# Patient Record
Sex: FEMALE | Race: BLACK OR AFRICAN AMERICAN
[De-identification: names, ages, dates, MRNs, and addresses within clinical notes are randomized per-mention and may not be internally consistent; named-entity substitution may affect disease eponyms.]

---

## 2020-02-09 NOTE — PDOC
History of Present Illness





- General


Chief Complaint: Pain


Stated Complaint: RIGHT SIDE PAIN


Time Seen by Provider: 20 16:12


History Source: Patient





- History of Present Illness


Timing/Duration: reports: intermittent


Abdominal Pain Onset Location: reports: RLQ


Pain Radiation: reports: no radiation





Past History





- Past Medical History


Allergies/Adverse Reactions: 


 Allergies











Allergy/AdvReac Type Severity Reaction Status Date / Time


 


Fish Containing Products Allergy Severe Hives Verified 20 15:53


 


lactose Allergy Severe Nausea Verified 20 15:53


 


tomato AdvReac Intermediate Nausea Verified 20 15:53











Home Medications: 


Ambulatory Orders





NK [No Known Home Medication]  12/21/15 








Anemia: Yes


Asthma: Yes


Cancer: No


Cardiac Disorders: No


CVA: No


COPD: No


CHF: No


Dementia: No


Diabetes: No


GI Disorders: No


 Disorders: No


HTN: No


Hypercholesterolemia: No


Kidney Stones: No


Liver Disease: No


Seizures: No


Thyroid Disease: No





- Reproductive History


 (#): 3


PID: No





- Immunization History


Immunization Up to Date: No





- Psycho Social/Smoking Cessation Hx


Smoking History: Current every day smoker


Have you smoked in the past 12 months: Yes


Number of Cigarettes Smoked Daily: 5


Information on smoking cessation initiated: No


'Breaking Loose' booklet given: 16


Hx Alcohol Use: Yes (long history of alcohol dependence)


Drug/Substance Use Hx: Yes (PCP)


Substance Use Type: Alcohol


Hx Substance Use Treatment: Yes





Abd/GI Specific PMHX





- Complaint Specific PMHX


Hepatitis: No


Pancreatitis: No





**Review of Systems





- Review of Systems


Constitutional: No: Chills, Fever


ABD/GI: No: Constipated, Diarrhea, Nausea, Vomiting


: No: Burning, Dysuria, Discharge, Hematuria





*Physical Exam





- Vital Signs


 Last Vital Signs











Temp Pulse Resp BP Pulse Ox


 


 98 F   90   18   109/69   100 


 


 20 15:50  20 15:50  20 15:50  20 15:50  20 15:50














- Physical Exam


General Appearance: Yes: Appropriately Dressed.  No: Apparent Distress


HEENT: positive: Normal Voice


Neck: positive: Supple


Respiratory/Chest: negative: Respiratory Distress


Female Pelvic Exam: positive: normal external exam, cervical os closed, other (

Exam limited as pt refused manual exam, states "I don't want to be violated").  

negative: Bartholin mass, vaginal bleeding


Gastrointestinal/Abdominal: positive: Tender (significant ttp to RLQ and mid 

lower abd), Soft


Musculoskeletal: negative: CVA Tenderness


Integumentary: positive: Dry, Warm


Neurologic: positive: Fully Oriented, Alert, Normal Mood/Affect





ED Treatment Course





- LABORATORY


CBC & Chemistry Diagram: 


 20 16:30





 20 16:30





Medical Decision Making





- Medical Decision Making





20 16:20


43 yo F, h/o PTSD, bipolar, polysubstance abuse, asthma, anemia, s/p blood 

transfusion x 2,  (1 ectopic s/p R salpingectomy, 2 elec ABs and 1  spon 

AB), with severe RLQ pain that has been intermittent x1 week.  No change in 

bowel movements, dysuria, vaginal discharge, nausea, vomiting, fever or chills.

  Does report that she missed her period last month.  





see exam





RLQ pain


R/o preg given hx vs appy, less likely uti/pyelo, unable to fully eval for 

possible PID as non-compliant w/ manual exam  


-pain control


-labs/ua


-?US vs CT





20 18:11


Labs only remarkable for + PCP and marijuana.  Patient was escorted to CAT scan 

and while there, refused to have CAT scan done.  When returned to the main ED, 

patient refused further evaluation and was yelling and screaming at staff.  At 

this point security was called to escort patient out of facility











Discharge





- Discharge Information


Problems reviewed: Yes


Clinical Impression/Diagnosis: 


 Patient left before treatment completed





Disposition: LEFT BEFORE MED OSEAS CONTRERAS





- Follow up/Referral





- Patient Discharge Instructions





- Post Discharge Activity

## 2021-06-09 ENCOUNTER — HOSPITAL ENCOUNTER (EMERGENCY)
Dept: HOSPITAL 74 - JER | Age: 46
Discharge: HOME | End: 2021-06-09
Payer: COMMERCIAL

## 2021-06-09 VITALS — SYSTOLIC BLOOD PRESSURE: 106 MMHG | DIASTOLIC BLOOD PRESSURE: 72 MMHG | TEMPERATURE: 97.9 F | HEART RATE: 78 BPM

## 2021-06-09 VITALS — BODY MASS INDEX: 18.8 KG/M2

## 2021-06-09 DIAGNOSIS — D25.9: ICD-10-CM

## 2021-06-09 DIAGNOSIS — N93.8: Primary | ICD-10-CM

## 2021-06-09 DIAGNOSIS — N83.201: ICD-10-CM

## 2021-06-09 LAB
ALBUMIN SERPL-MCNC: 3.9 G/DL (ref 3.4–5)
ALP SERPL-CCNC: 59 U/L (ref 45–117)
ALT SERPL-CCNC: 16 U/L (ref 13–61)
ANION GAP SERPL CALC-SCNC: 7 MMOL/L (ref 8–16)
AST SERPL-CCNC: 15 U/L (ref 15–37)
BASOPHILS # BLD: 1 % (ref 0–2)
BILIRUB SERPL-MCNC: 0.4 MG/DL (ref 0.2–1)
BUN SERPL-MCNC: 6.2 MG/DL (ref 7–18)
CALCIUM SERPL-MCNC: 9.4 MG/DL (ref 8.5–10.1)
CHLORIDE SERPL-SCNC: 108 MMOL/L (ref 98–107)
CO2 SERPL-SCNC: 24 MMOL/L (ref 21–32)
CREAT SERPL-MCNC: 0.7 MG/DL (ref 0.55–1.3)
DEPRECATED RDW RBC AUTO: 20 % (ref 11.6–15.6)
EOSINOPHIL # BLD: 0.9 % (ref 0–4.5)
GLUCOSE SERPL-MCNC: 83 MG/DL (ref 74–106)
HCT VFR BLD CALC: 28.4 % (ref 32.4–45.2)
HGB BLD-MCNC: 9.1 GM/DL (ref 10.7–15.3)
LYMPHOCYTES # BLD: 26.3 % (ref 8–40)
MCH RBC QN AUTO: 23.2 PG (ref 25.7–33.7)
MCHC RBC AUTO-ENTMCNC: 32.2 G/DL (ref 32–36)
MCV RBC: 72 FL (ref 80–96)
MONOCYTES # BLD AUTO: 8.6 % (ref 3.8–10.2)
NEUTROPHILS # BLD: 63.2 % (ref 42.8–82.8)
PLATELET # BLD AUTO: 191 K/MM3 (ref 134–434)
PMV BLD: 9.2 FL (ref 7.5–11.1)
PROT SERPL-MCNC: 7.2 G/DL (ref 6.4–8.2)
RBC # BLD AUTO: 3.94 M/MM3 (ref 3.6–5.2)
SODIUM SERPL-SCNC: 139 MMOL/L (ref 136–145)
WBC # BLD AUTO: 6.2 K/MM3 (ref 4–10)

## 2021-06-09 PROCEDURE — U0005 INFEC AGEN DETEC AMPLI PROBE: HCPCS

## 2021-06-09 PROCEDURE — C9803 HOPD COVID-19 SPEC COLLECT: HCPCS

## 2021-06-09 PROCEDURE — U0003 INFECTIOUS AGENT DETECTION BY NUCLEIC ACID (DNA OR RNA); SEVERE ACUTE RESPIRATORY SYNDROME CORONAVIRUS 2 (SARS-COV-2) (CORONAVIRUS DISEASE [COVID-19]), AMPLIFIED PROBE TECHNIQUE, MAKING USE OF HIGH THROUGHPUT TECHNOLOGIES AS DESCRIBED BY CMS-2020-01-R: HCPCS

## 2021-06-09 PROCEDURE — 3E0337Z INTRODUCTION OF ELECTROLYTIC AND WATER BALANCE SUBSTANCE INTO PERIPHERAL VEIN, PERCUTANEOUS APPROACH: ICD-10-PCS

## 2021-06-09 PROCEDURE — 3E0333Z INTRODUCTION OF ANTI-INFLAMMATORY INTO PERIPHERAL VEIN, PERCUTANEOUS APPROACH: ICD-10-PCS

## 2022-05-05 ENCOUNTER — HOSPITAL ENCOUNTER (INPATIENT)
Dept: HOSPITAL 74 - YASAS | Age: 47
LOS: 14 days | Discharge: HOME | DRG: 772 | End: 2022-05-19
Attending: ALLERGY & IMMUNOLOGY | Admitting: ALLERGY & IMMUNOLOGY
Payer: COMMERCIAL

## 2022-05-05 VITALS — BODY MASS INDEX: 19.7 KG/M2

## 2022-05-05 DIAGNOSIS — F31.60: ICD-10-CM

## 2022-05-05 DIAGNOSIS — B35.3: ICD-10-CM

## 2022-05-05 DIAGNOSIS — Z91.013: ICD-10-CM

## 2022-05-05 DIAGNOSIS — F10.20: Primary | ICD-10-CM

## 2022-05-05 DIAGNOSIS — J45.20: ICD-10-CM

## 2022-05-05 DIAGNOSIS — F16.20: ICD-10-CM

## 2022-05-05 DIAGNOSIS — F17.210: ICD-10-CM

## 2022-05-05 DIAGNOSIS — F29: ICD-10-CM

## 2022-05-05 DIAGNOSIS — F43.10: ICD-10-CM

## 2022-05-05 DIAGNOSIS — R22.9: ICD-10-CM

## 2022-05-05 DIAGNOSIS — Z91.012: ICD-10-CM

## 2022-05-05 DIAGNOSIS — F19.950: ICD-10-CM

## 2022-05-05 DIAGNOSIS — T78.1XXA: ICD-10-CM

## 2022-05-05 DIAGNOSIS — F20.9: ICD-10-CM

## 2022-05-05 DIAGNOSIS — Z56.0: ICD-10-CM

## 2022-05-05 DIAGNOSIS — F19.24: ICD-10-CM

## 2022-05-05 DIAGNOSIS — Z59.00: ICD-10-CM

## 2022-05-05 DIAGNOSIS — Z91.018: ICD-10-CM

## 2022-05-05 PROCEDURE — U0003 INFECTIOUS AGENT DETECTION BY NUCLEIC ACID (DNA OR RNA); SEVERE ACUTE RESPIRATORY SYNDROME CORONAVIRUS 2 (SARS-COV-2) (CORONAVIRUS DISEASE [COVID-19]), AMPLIFIED PROBE TECHNIQUE, MAKING USE OF HIGH THROUGHPUT TECHNOLOGIES AS DESCRIBED BY CMS-2020-01-R: HCPCS

## 2022-05-05 PROCEDURE — U0005 INFEC AGEN DETEC AMPLI PROBE: HCPCS

## 2022-05-05 PROCEDURE — HZ42ZZZ GROUP COUNSELING FOR SUBSTANCE ABUSE TREATMENT, COGNITIVE-BEHAVIORAL: ICD-10-PCS | Performed by: ALLERGY & IMMUNOLOGY

## 2022-05-05 RX ADMIN — Medication SCH MG: at 22:08

## 2022-05-05 RX ADMIN — HYDROXYZINE PAMOATE SCH MG: 25 CAPSULE ORAL at 22:13

## 2022-05-05 RX ADMIN — HYDROXYZINE PAMOATE SCH MG: 25 CAPSULE ORAL at 22:07

## 2022-05-05 RX ADMIN — Medication SCH TAB: at 22:08

## 2022-05-05 RX ADMIN — NICOTINE SCH MG: 7 PATCH TRANSDERMAL at 22:07

## 2022-05-05 SDOH — ECONOMIC STABILITY - INCOME SECURITY: UNEMPLOYMENT, UNSPECIFIED: Z56.0

## 2022-05-05 SDOH — ECONOMIC STABILITY - HOUSING INSECURITY: HOMELESSNESS UNSPECIFIED: Z59.00

## 2022-05-06 RX ADMIN — Medication SCH MG: at 22:48

## 2022-05-06 RX ADMIN — HYDROXYZINE PAMOATE SCH: 25 CAPSULE ORAL at 15:26

## 2022-05-06 RX ADMIN — NICOTINE SCH: 7 PATCH TRANSDERMAL at 11:34

## 2022-05-06 RX ADMIN — QUETIAPINE FUMARATE SCH MG: 100 TABLET ORAL at 22:49

## 2022-05-06 RX ADMIN — HYDROXYZINE PAMOATE SCH: 25 CAPSULE ORAL at 11:35

## 2022-05-06 RX ADMIN — Medication SCH: at 11:35

## 2022-05-06 RX ADMIN — HYDROXYZINE PAMOATE SCH MG: 25 CAPSULE ORAL at 06:48

## 2022-05-07 RX ADMIN — Medication SCH TAB: at 10:51

## 2022-05-07 RX ADMIN — HYDROXYZINE PAMOATE PRN MG: 25 CAPSULE ORAL at 21:15

## 2022-05-07 RX ADMIN — Medication SCH MG: at 21:15

## 2022-05-07 RX ADMIN — NICOTINE SCH MG: 7 PATCH TRANSDERMAL at 10:52

## 2022-05-07 RX ADMIN — Medication SCH MG: at 21:14

## 2022-05-07 RX ADMIN — QUETIAPINE FUMARATE SCH MG: 100 TABLET ORAL at 21:15

## 2022-05-08 RX ADMIN — Medication SCH MG: at 21:24

## 2022-05-08 RX ADMIN — IBUPROFEN PRN MG: 400 TABLET, FILM COATED ORAL at 21:24

## 2022-05-08 RX ADMIN — NICOTINE SCH MG: 7 PATCH TRANSDERMAL at 10:44

## 2022-05-08 RX ADMIN — Medication SCH TAB: at 10:43

## 2022-05-08 RX ADMIN — IBUPROFEN PRN MG: 400 TABLET, FILM COATED ORAL at 10:46

## 2022-05-08 RX ADMIN — HYDROXYZINE PAMOATE PRN MG: 25 CAPSULE ORAL at 21:26

## 2022-05-08 RX ADMIN — QUETIAPINE FUMARATE SCH MG: 100 TABLET ORAL at 21:24

## 2022-05-09 LAB
ALBUMIN SERPL-MCNC: 3.6 G/DL (ref 3.4–5)
ALP SERPL-CCNC: 58 U/L (ref 45–117)
ALT SERPL-CCNC: 14 U/L (ref 13–61)
ANION GAP SERPL CALC-SCNC: 6 MMOL/L (ref 8–16)
APPEARANCE UR: CLEAR
AST SERPL-CCNC: 14 U/L (ref 15–37)
BILIRUB SERPL-MCNC: 0.3 MG/DL (ref 0.2–1)
BILIRUB UR STRIP.AUTO-MCNC: NEGATIVE MG/DL
BUN SERPL-MCNC: 13.2 MG/DL (ref 7–18)
CALCIUM SERPL-MCNC: 9.2 MG/DL (ref 8.5–10.1)
CHLORIDE SERPL-SCNC: 108 MMOL/L (ref 98–107)
CO2 SERPL-SCNC: 24 MMOL/L (ref 21–32)
COLOR UR: YELLOW
CREAT SERPL-MCNC: 0.7 MG/DL (ref 0.55–1.3)
DEPRECATED RDW RBC AUTO: 24.8 % (ref 11.6–15.6)
GLUCOSE SERPL-MCNC: 90 MG/DL (ref 74–106)
HCT VFR BLD CALC: 26.8 % (ref 32.4–45.2)
HGB BLD-MCNC: 7.6 GM/DL (ref 10.7–15.3)
KETONES UR QL STRIP: NEGATIVE
LEUKOCYTE ESTERASE UR QL STRIP.AUTO: NEGATIVE
MCH RBC QN AUTO: 17.9 PG (ref 25.7–33.7)
MCHC RBC AUTO-ENTMCNC: 28.4 G/DL (ref 32–36)
MCV RBC: 62.9 FL (ref 80–96)
NITRITE UR QL STRIP: NEGATIVE
PH UR: 5 [PH] (ref 5–8)
PLATELET # BLD AUTO: 392 10^3/UL (ref 134–434)
PMV BLD: 9.4 FL (ref 7.5–11.1)
PROT SERPL-MCNC: 7 G/DL (ref 6.4–8.2)
PROT UR QL STRIP: NEGATIVE
PROT UR QL STRIP: NEGATIVE
RBC # BLD AUTO: 4.25 M/MM3 (ref 3.6–5.2)
SODIUM SERPL-SCNC: 138 MMOL/L (ref 136–145)
SP GR UR: 1.03 (ref 1.01–1.03)
UROBILINOGEN UR STRIP-MCNC: 0.2 MG/DL (ref 0.2–1)
WBC # BLD AUTO: 5.4 K/MM3 (ref 4–10)

## 2022-05-09 RX ADMIN — Medication SCH MG: at 21:42

## 2022-05-09 RX ADMIN — IBUPROFEN PRN MG: 400 TABLET, FILM COATED ORAL at 21:43

## 2022-05-09 RX ADMIN — Medication SCH TAB: at 10:48

## 2022-05-09 RX ADMIN — NICOTINE SCH MG: 7 PATCH TRANSDERMAL at 10:48

## 2022-05-09 RX ADMIN — HYDROXYZINE PAMOATE PRN MG: 25 CAPSULE ORAL at 21:45

## 2022-05-09 RX ADMIN — Medication PRN APPLIC: at 16:37

## 2022-05-09 RX ADMIN — BENZOCAINE PRN APPLIC: 200 GEL TOPICAL at 21:45

## 2022-05-09 RX ADMIN — QUETIAPINE FUMARATE SCH MG: 100 TABLET ORAL at 21:42

## 2022-05-10 LAB — TREPONEMA PALLIDUM AB [UNITS/VOLUME] IN SERUM OR PLASMA BY IMMUNOASSAY: (no result)

## 2022-05-10 RX ADMIN — Medication SCH MG: at 21:33

## 2022-05-10 RX ADMIN — QUETIAPINE FUMARATE SCH MG: 100 TABLET ORAL at 21:33

## 2022-05-10 RX ADMIN — IBUPROFEN PRN MG: 400 TABLET, FILM COATED ORAL at 10:52

## 2022-05-10 RX ADMIN — Medication SCH TAB: at 10:50

## 2022-05-10 RX ADMIN — NICOTINE SCH MG: 7 PATCH TRANSDERMAL at 10:54

## 2022-05-10 RX ADMIN — HYDROXYZINE PAMOATE PRN MG: 25 CAPSULE ORAL at 21:34

## 2022-05-10 RX ADMIN — HYDROXYZINE PAMOATE PRN MG: 25 CAPSULE ORAL at 10:51

## 2022-05-11 RX ADMIN — TOLNAFTATE SCH APPLIC: 10 CREAM TOPICAL at 21:40

## 2022-05-11 RX ADMIN — IBUPROFEN PRN MG: 400 TABLET, FILM COATED ORAL at 10:25

## 2022-05-11 RX ADMIN — Medication SCH MG: at 21:36

## 2022-05-11 RX ADMIN — TOLNAFTATE SCH APPLIC: 10 CREAM TOPICAL at 15:02

## 2022-05-11 RX ADMIN — QUETIAPINE FUMARATE SCH MG: 100 TABLET ORAL at 21:36

## 2022-05-11 RX ADMIN — IBUPROFEN PRN MG: 400 TABLET, FILM COATED ORAL at 21:40

## 2022-05-11 RX ADMIN — Medication SCH TAB: at 10:24

## 2022-05-11 RX ADMIN — HYDROXYZINE PAMOATE PRN MG: 25 CAPSULE ORAL at 21:40

## 2022-05-11 RX ADMIN — NICOTINE SCH MG: 7 PATCH TRANSDERMAL at 10:24

## 2022-05-11 RX ADMIN — HYDROXYZINE PAMOATE PRN MG: 25 CAPSULE ORAL at 10:27

## 2022-05-12 RX ADMIN — Medication SCH MG: at 21:22

## 2022-05-12 RX ADMIN — HYDROXYZINE PAMOATE PRN MG: 25 CAPSULE ORAL at 10:50

## 2022-05-12 RX ADMIN — Medication SCH MG: at 21:23

## 2022-05-12 RX ADMIN — IBUPROFEN PRN MG: 400 TABLET, FILM COATED ORAL at 21:22

## 2022-05-12 RX ADMIN — TOLNAFTATE SCH APPLIC: 10 CREAM TOPICAL at 21:23

## 2022-05-12 RX ADMIN — Medication SCH TAB: at 10:49

## 2022-05-12 RX ADMIN — NICOTINE SCH MG: 7 PATCH TRANSDERMAL at 10:49

## 2022-05-12 RX ADMIN — IBUPROFEN PRN MG: 400 TABLET, FILM COATED ORAL at 10:51

## 2022-05-12 RX ADMIN — HYDROXYZINE PAMOATE PRN MG: 25 CAPSULE ORAL at 21:22

## 2022-05-12 RX ADMIN — TOLNAFTATE SCH APPLIC: 10 CREAM TOPICAL at 10:53

## 2022-05-12 RX ADMIN — QUETIAPINE FUMARATE SCH MG: 100 TABLET ORAL at 21:23

## 2022-05-13 RX ADMIN — HYDROXYZINE PAMOATE PRN MG: 25 CAPSULE ORAL at 21:28

## 2022-05-13 RX ADMIN — Medication SCH TAB: at 10:29

## 2022-05-13 RX ADMIN — TOLNAFTATE SCH APPLIC: 10 CREAM TOPICAL at 21:38

## 2022-05-13 RX ADMIN — NICOTINE SCH MG: 7 PATCH TRANSDERMAL at 10:29

## 2022-05-13 RX ADMIN — BENZOCAINE PRN APPLIC: 200 GEL TOPICAL at 21:32

## 2022-05-13 RX ADMIN — QUETIAPINE FUMARATE SCH MG: 100 TABLET ORAL at 21:28

## 2022-05-13 RX ADMIN — TOLNAFTATE SCH: 10 CREAM TOPICAL at 10:32

## 2022-05-13 RX ADMIN — IBUPROFEN PRN MG: 400 TABLET, FILM COATED ORAL at 21:32

## 2022-05-13 RX ADMIN — Medication SCH MG: at 21:28

## 2022-05-13 RX ADMIN — IBUPROFEN PRN MG: 400 TABLET, FILM COATED ORAL at 10:30

## 2022-05-13 RX ADMIN — HYDROXYZINE PAMOATE PRN MG: 25 CAPSULE ORAL at 10:30

## 2022-05-13 RX ADMIN — ANALGESIC BALM SCH APPLIC: 1.74; 4.06 OINTMENT TOPICAL at 21:29

## 2022-05-14 RX ADMIN — IBUPROFEN PRN MG: 400 TABLET, FILM COATED ORAL at 11:03

## 2022-05-14 RX ADMIN — ANALGESIC BALM SCH: 1.74; 4.06 OINTMENT TOPICAL at 21:52

## 2022-05-14 RX ADMIN — NICOTINE SCH MG: 7 PATCH TRANSDERMAL at 11:00

## 2022-05-14 RX ADMIN — Medication SCH MG: at 21:50

## 2022-05-14 RX ADMIN — HYDROXYZINE PAMOATE PRN MG: 25 CAPSULE ORAL at 21:51

## 2022-05-14 RX ADMIN — TOLNAFTATE SCH APPLIC: 10 CREAM TOPICAL at 11:01

## 2022-05-14 RX ADMIN — QUETIAPINE FUMARATE SCH MG: 100 TABLET ORAL at 21:51

## 2022-05-14 RX ADMIN — TOLNAFTATE SCH: 10 CREAM TOPICAL at 21:52

## 2022-05-14 RX ADMIN — ANALGESIC BALM SCH: 1.74; 4.06 OINTMENT TOPICAL at 11:00

## 2022-05-14 RX ADMIN — IBUPROFEN PRN MG: 400 TABLET, FILM COATED ORAL at 21:52

## 2022-05-14 RX ADMIN — Medication PRN APPLIC: at 11:02

## 2022-05-14 RX ADMIN — Medication SCH TAB: at 11:00

## 2022-05-14 RX ADMIN — HYDROXYZINE PAMOATE PRN MG: 25 CAPSULE ORAL at 11:03

## 2022-05-15 RX ADMIN — ANALGESIC BALM SCH: 1.74; 4.06 OINTMENT TOPICAL at 21:17

## 2022-05-15 RX ADMIN — HYDROXYZINE PAMOATE PRN MG: 25 CAPSULE ORAL at 21:18

## 2022-05-15 RX ADMIN — IBUPROFEN PRN MG: 400 TABLET, FILM COATED ORAL at 10:31

## 2022-05-15 RX ADMIN — TOLNAFTATE SCH: 10 CREAM TOPICAL at 22:48

## 2022-05-15 RX ADMIN — IBUPROFEN PRN MG: 400 TABLET, FILM COATED ORAL at 21:18

## 2022-05-15 RX ADMIN — ANALGESIC BALM SCH: 1.74; 4.06 OINTMENT TOPICAL at 10:30

## 2022-05-15 RX ADMIN — Medication SCH MG: at 22:48

## 2022-05-15 RX ADMIN — Medication SCH TAB: at 10:30

## 2022-05-15 RX ADMIN — TOLNAFTATE SCH: 10 CREAM TOPICAL at 10:32

## 2022-05-15 RX ADMIN — NICOTINE SCH MG: 7 PATCH TRANSDERMAL at 10:30

## 2022-05-15 RX ADMIN — Medication SCH MG: at 21:17

## 2022-05-16 RX ADMIN — TOLNAFTATE SCH: 10 CREAM TOPICAL at 11:22

## 2022-05-16 RX ADMIN — HYDROXYZINE PAMOATE PRN MG: 25 CAPSULE ORAL at 21:21

## 2022-05-16 RX ADMIN — IBUPROFEN PRN MG: 400 TABLET, FILM COATED ORAL at 21:20

## 2022-05-16 RX ADMIN — Medication SCH MG: at 21:21

## 2022-05-16 RX ADMIN — ANALGESIC BALM SCH: 1.74; 4.06 OINTMENT TOPICAL at 21:21

## 2022-05-16 RX ADMIN — TOLNAFTATE SCH APPLIC: 10 CREAM TOPICAL at 21:19

## 2022-05-16 RX ADMIN — Medication SCH TAB: at 11:20

## 2022-05-16 RX ADMIN — ANALGESIC BALM SCH: 1.74; 4.06 OINTMENT TOPICAL at 11:21

## 2022-05-16 RX ADMIN — IBUPROFEN PRN MG: 400 TABLET, FILM COATED ORAL at 11:23

## 2022-05-16 RX ADMIN — NICOTINE SCH MG: 7 PATCH TRANSDERMAL at 11:20

## 2022-05-17 RX ADMIN — ANALGESIC BALM SCH: 1.74; 4.06 OINTMENT TOPICAL at 21:30

## 2022-05-17 RX ADMIN — DIPHENHYDRAMINE HCL SCH MG: 25 CAPSULE ORAL at 21:30

## 2022-05-17 RX ADMIN — TOLNAFTATE SCH: 10 CREAM TOPICAL at 11:01

## 2022-05-17 RX ADMIN — IBUPROFEN PRN MG: 400 TABLET, FILM COATED ORAL at 11:04

## 2022-05-17 RX ADMIN — QUETIAPINE FUMARATE SCH MG: 100 TABLET ORAL at 21:31

## 2022-05-17 RX ADMIN — TOLNAFTATE SCH APPLIC: 10 CREAM TOPICAL at 21:30

## 2022-05-17 RX ADMIN — QUETIAPINE FUMARATE SCH MG: 100 TABLET ORAL at 11:01

## 2022-05-17 RX ADMIN — Medication SCH MG: at 21:31

## 2022-05-17 RX ADMIN — Medication SCH TAB: at 11:01

## 2022-05-17 RX ADMIN — ANALGESIC BALM SCH: 1.74; 4.06 OINTMENT TOPICAL at 11:01

## 2022-05-17 RX ADMIN — Medication SCH MG: at 21:30

## 2022-05-17 RX ADMIN — NICOTINE SCH MG: 7 PATCH TRANSDERMAL at 11:00

## 2022-05-18 RX ADMIN — ANALGESIC BALM SCH: 1.74; 4.06 OINTMENT TOPICAL at 11:01

## 2022-05-18 RX ADMIN — ANALGESIC BALM SCH: 1.74; 4.06 OINTMENT TOPICAL at 21:21

## 2022-05-18 RX ADMIN — QUETIAPINE FUMARATE SCH MG: 100 TABLET ORAL at 21:20

## 2022-05-18 RX ADMIN — HYDROXYZINE PAMOATE PRN MG: 25 CAPSULE ORAL at 13:35

## 2022-05-18 RX ADMIN — HYDROXYZINE PAMOATE PRN MG: 25 CAPSULE ORAL at 21:21

## 2022-05-18 RX ADMIN — TOLNAFTATE SCH APPLIC: 10 CREAM TOPICAL at 21:21

## 2022-05-18 RX ADMIN — Medication SCH MG: at 21:21

## 2022-05-18 RX ADMIN — IBUPROFEN PRN MG: 400 TABLET, FILM COATED ORAL at 21:23

## 2022-05-18 RX ADMIN — Medication SCH TAB: at 11:01

## 2022-05-18 RX ADMIN — NICOTINE SCH MG: 7 PATCH TRANSDERMAL at 11:01

## 2022-05-18 RX ADMIN — TOLNAFTATE SCH APPLIC: 10 CREAM TOPICAL at 11:06

## 2022-05-18 RX ADMIN — QUETIAPINE FUMARATE SCH MG: 100 TABLET ORAL at 11:03

## 2022-05-18 RX ADMIN — DIPHENHYDRAMINE HCL SCH: 25 CAPSULE ORAL at 11:03

## 2022-05-18 RX ADMIN — Medication SCH MG: at 21:20

## 2022-05-19 VITALS — TEMPERATURE: 98.6 F | HEART RATE: 80 BPM | SYSTOLIC BLOOD PRESSURE: 109 MMHG | DIASTOLIC BLOOD PRESSURE: 71 MMHG

## 2022-05-19 RX ADMIN — TOLNAFTATE SCH: 10 CREAM TOPICAL at 10:10

## 2022-05-19 RX ADMIN — QUETIAPINE FUMARATE SCH MG: 100 TABLET ORAL at 10:10

## 2022-05-19 RX ADMIN — ANALGESIC BALM SCH: 1.74; 4.06 OINTMENT TOPICAL at 10:10

## 2022-05-19 RX ADMIN — NICOTINE SCH: 7 PATCH TRANSDERMAL at 10:11

## 2022-05-19 RX ADMIN — HYDROXYZINE PAMOATE PRN MG: 25 CAPSULE ORAL at 10:12

## 2022-05-19 RX ADMIN — Medication SCH TAB: at 10:10

## 2024-11-26 ENCOUNTER — HOSPITAL ENCOUNTER (INPATIENT)
Dept: HOSPITAL 74 - YASAS | Age: 49
LOS: 2 days | Discharge: LEFT BEFORE DISCHARGE | DRG: 772 | End: 2024-11-28
Attending: PSYCHIATRY & NEUROLOGY | Admitting: ALLERGY & IMMUNOLOGY
Payer: COMMERCIAL

## 2024-11-26 VITALS — BODY MASS INDEX: 18.7 KG/M2

## 2024-11-26 VITALS — TEMPERATURE: 98.7 F

## 2024-11-26 DIAGNOSIS — G47.00: ICD-10-CM

## 2024-11-26 DIAGNOSIS — F91.8: ICD-10-CM

## 2024-11-26 DIAGNOSIS — F43.10: ICD-10-CM

## 2024-11-26 DIAGNOSIS — Z91.199: ICD-10-CM

## 2024-11-26 DIAGNOSIS — F16.20: Primary | ICD-10-CM

## 2024-11-26 DIAGNOSIS — F17.210: ICD-10-CM

## 2024-11-26 DIAGNOSIS — F20.9: ICD-10-CM

## 2024-11-26 LAB
APPEARANCE UR: CLEAR
BACTERIA # UR AUTO: 53 /UL (ref 0–1359)
BILIRUB UR STRIP.AUTO-MCNC: NEGATIVE MG/DL
CASTS URNS QL MICRO: 1 /UL (ref 0–3.1)
COLOR UR: YELLOW
EPITH CASTS URNS QL MICRO: 17 /UL (ref 0–25.1)
KETONES UR QL STRIP: NEGATIVE
LEUKOCYTE ESTERASE UR QL STRIP.AUTO: (no result)
NITRITE UR QL STRIP: NEGATIVE
PH UR: 6 [PH] (ref 5–8)
PROT UR QL STRIP: NEGATIVE
PROT UR QL STRIP: NEGATIVE
RBC # BLD AUTO: 2221 /UL (ref 0–23.9)
SP GR UR: 1.01 (ref 1.01–1.03)
UROBILINOGEN UR STRIP-MCNC: 0.2 MG/DL (ref 0.2–1)
WBC # UR AUTO: 43 /UL (ref 0–25.8)

## 2024-11-26 PROCEDURE — HZ42ZZZ GROUP COUNSELING FOR SUBSTANCE ABUSE TREATMENT, COGNITIVE-BEHAVIORAL: ICD-10-PCS | Performed by: PSYCHIATRY & NEUROLOGY

## 2024-11-26 RX ADMIN — Medication SCH MG: at 21:08

## 2024-11-26 RX ADMIN — TUBERCULIN PURIFIED PROTEIN DERIVATIVE ONE UNITS: 5 INJECTION, SOLUTION INTRADERMAL at 19:26

## 2024-11-26 RX ADMIN — HYDROXYZINE PAMOATE PRN MG: 25 CAPSULE ORAL at 21:08

## 2024-11-27 VITALS — DIASTOLIC BLOOD PRESSURE: 78 MMHG | SYSTOLIC BLOOD PRESSURE: 111 MMHG | HEART RATE: 72 BPM | RESPIRATION RATE: 16 BRPM

## 2024-11-27 RX ADMIN — QUETIAPINE FUMARATE ONE MG: 100 TABLET ORAL at 01:45

## 2024-11-27 RX ADMIN — Medication SCH: at 10:14

## 2024-11-27 RX ADMIN — Medication ONE MG: at 00:45

## 2024-11-27 RX ADMIN — QUETIAPINE FUMARATE SCH MG: 100 TABLET ORAL at 22:24

## 2024-11-28 RX ADMIN — NALOXONE HYDROCHLORIDE SCH: 4 SPRAY NASAL at 02:05
